# Patient Record
Sex: FEMALE | Race: WHITE | NOT HISPANIC OR LATINO | Employment: OTHER | URBAN - METROPOLITAN AREA
[De-identification: names, ages, dates, MRNs, and addresses within clinical notes are randomized per-mention and may not be internally consistent; named-entity substitution may affect disease eponyms.]

---

## 2017-10-13 ENCOUNTER — ALLSCRIPTS OFFICE VISIT (OUTPATIENT)
Dept: OTHER | Facility: OTHER | Age: 82
End: 2017-10-13

## 2017-10-13 ENCOUNTER — TRANSCRIBE ORDERS (OUTPATIENT)
Dept: ADMINISTRATIVE | Facility: HOSPITAL | Age: 82
End: 2017-10-13

## 2017-10-13 DIAGNOSIS — N83.209 CYST OF OVARY, UNSPECIFIED LATERALITY: ICD-10-CM

## 2017-10-13 DIAGNOSIS — C64.2 MALIGNANT NEOPLASM OF LEFT KIDNEY, EXCEPT RENAL PELVIS (HCC): Primary | ICD-10-CM

## 2017-10-13 LAB
BILIRUB UR QL STRIP: NORMAL
CLARITY UR: NORMAL
COLOR UR: YELLOW
GLUCOSE (HISTORICAL): NORMAL
HGB UR QL STRIP.AUTO: NORMAL
KETONES UR STRIP-MCNC: NORMAL MG/DL
LEUKOCYTE ESTERASE UR QL STRIP: NORMAL
NITRITE UR QL STRIP: NORMAL
PH UR STRIP.AUTO: 7.5 [PH]
PROT UR STRIP-MCNC: NORMAL MG/DL
SP GR UR STRIP.AUTO: 1.01
UROBILINOGEN UR QL STRIP.AUTO: 0.2

## 2018-01-14 VITALS
WEIGHT: 184 LBS | SYSTOLIC BLOOD PRESSURE: 154 MMHG | BODY MASS INDEX: 29.57 KG/M2 | HEIGHT: 66 IN | DIASTOLIC BLOOD PRESSURE: 82 MMHG

## 2018-09-28 ENCOUNTER — TELEPHONE (OUTPATIENT)
Dept: UROLOGY | Facility: AMBULATORY SURGERY CENTER | Age: 83
End: 2018-09-28

## 2018-10-02 ENCOUNTER — HOSPITAL ENCOUNTER (OUTPATIENT)
Dept: ULTRASOUND IMAGING | Facility: HOSPITAL | Age: 83
Discharge: HOME | End: 2018-10-02
Attending: UROLOGY
Payer: MEDICARE

## 2018-10-02 DIAGNOSIS — C64.2 MALIGNANT NEOPLASM OF LEFT KIDNEY, EXCEPT RENAL PELVIS (HCC): ICD-10-CM

## 2018-10-02 DIAGNOSIS — N83.209 CYST OF OVARY, UNSPECIFIED LATERALITY: ICD-10-CM

## 2018-10-02 DIAGNOSIS — C64.2 MALIGNANT NEOPLASM OF LEFT KIDNEY, EXCEPT RENAL PELVIS (HCC): Primary | ICD-10-CM

## 2018-10-02 PROCEDURE — 76770 US EXAM ABDO BACK WALL COMP: CPT

## 2018-10-03 ENCOUNTER — TELEPHONE (OUTPATIENT)
Dept: UROLOGY | Facility: MEDICAL CENTER | Age: 83
End: 2018-10-03

## 2018-10-12 RX ORDER — AMLODIPINE BESYLATE 2.5 MG/1
TABLET ORAL
Refills: 5 | COMMUNITY
Start: 2018-10-04

## 2018-10-12 RX ORDER — LANOLIN ALCOHOL/MO/W.PET/CERES
1 CREAM (GRAM) TOPICAL 3 TIMES DAILY
COMMUNITY

## 2018-10-12 RX ORDER — PYRIDOXINE HCL (VITAMIN B6) 100 MG
2 TABLET ORAL 2 TIMES DAILY
COMMUNITY

## 2018-10-12 RX ORDER — ZOLEDRONIC ACID 5 MG/100ML
5 INJECTION, SOLUTION INTRAVENOUS
COMMUNITY

## 2018-10-12 RX ORDER — CALCIUM CARBONATE/VITAMIN D3 500-10/5ML
1 LIQUID (ML) ORAL DAILY
COMMUNITY

## 2018-10-12 RX ORDER — FLUTICASONE PROPIONATE 220 UG/1
AEROSOL, METERED RESPIRATORY (INHALATION)
Refills: 11 | COMMUNITY
Start: 2018-09-17

## 2018-10-12 RX ORDER — AMOXICILLIN 250 MG
1 CAPSULE ORAL DAILY PRN
COMMUNITY

## 2018-10-12 RX ORDER — WARFARIN SODIUM 5 MG/1
TABLET ORAL
Refills: 4 | COMMUNITY
Start: 2018-08-14 | End: 2018-10-15 | Stop reason: ALTCHOICE

## 2018-10-12 RX ORDER — CETIRIZINE HYDROCHLORIDE 10 MG/1
10 TABLET ORAL
COMMUNITY
Start: 2010-12-11

## 2018-10-12 RX ORDER — ALPRAZOLAM 1 MG/1
TABLET ORAL
Refills: 0 | COMMUNITY
Start: 2018-09-11

## 2018-10-12 RX ORDER — LOSARTAN POTASSIUM 100 MG/1
TABLET ORAL
Refills: 1 | COMMUNITY
Start: 2018-08-20

## 2018-10-12 RX ORDER — CHLORAL HYDRATE 500 MG
1 CAPSULE ORAL 2 TIMES DAILY
COMMUNITY

## 2018-10-12 RX ORDER — RIVAROXABAN 20 MG/1
TABLET, FILM COATED ORAL
Refills: 5 | COMMUNITY
Start: 2018-09-28

## 2018-10-12 RX ORDER — DOCUSATE SODIUM 100 MG/1
1 CAPSULE, LIQUID FILLED ORAL DAILY
COMMUNITY

## 2018-10-12 RX ORDER — CHOLECALCIFEROL (VITAMIN D3) 125 MCG
1 CAPSULE ORAL
COMMUNITY

## 2018-10-12 RX ORDER — OMEPRAZOLE 40 MG/1
CAPSULE, DELAYED RELEASE ORAL
Refills: 3 | COMMUNITY
Start: 2018-08-18

## 2018-10-12 RX ORDER — ERGOCALCIFEROL (VITAMIN D2) 10 MCG
TABLET ORAL
COMMUNITY

## 2018-10-13 DIAGNOSIS — N83.209 CYST OF OVARY: ICD-10-CM

## 2018-10-13 DIAGNOSIS — C64.2 MALIGNANT NEOPLASM OF LEFT KIDNEY, EXCEPT RENAL PELVIS (HCC): ICD-10-CM

## 2018-10-15 ENCOUNTER — OFFICE VISIT (OUTPATIENT)
Dept: UROLOGY | Facility: MEDICAL CENTER | Age: 83
End: 2018-10-15
Payer: MEDICARE

## 2018-10-15 VITALS
DIASTOLIC BLOOD PRESSURE: 80 MMHG | SYSTOLIC BLOOD PRESSURE: 150 MMHG | BODY MASS INDEX: 29.66 KG/M2 | WEIGHT: 178 LBS | HEIGHT: 65 IN

## 2018-10-15 DIAGNOSIS — C64.9 MALIGNANT NEOPLASM OF KIDNEY, UNSPECIFIED LATERALITY (HCC): Primary | ICD-10-CM

## 2018-10-15 LAB
SL AMB  POCT GLUCOSE, UA: NORMAL
SL AMB LEUKOCYTE ESTERASE,UA: NORMAL
SL AMB POCT BILIRUBIN,UA: NORMAL
SL AMB POCT BLOOD,UA: NORMAL
SL AMB POCT CLARITY,UA: CLEAR
SL AMB POCT COLOR,UA: YELLOW
SL AMB POCT KETONES,UA: NORMAL
SL AMB POCT NITRITE,UA: NORMAL
SL AMB POCT PH,UA: 7.5
SL AMB POCT SPECIFIC GRAVITY,UA: 1.01
SL AMB POCT URINE PROTEIN: NORMAL
SL AMB POCT UROBILINOGEN: 0.2

## 2018-10-15 PROCEDURE — 81003 URINALYSIS AUTO W/O SCOPE: CPT | Performed by: UROLOGY

## 2018-10-15 PROCEDURE — 99213 OFFICE O/P EST LOW 20 MIN: CPT | Performed by: UROLOGY

## 2018-10-15 RX ORDER — ALBUTEROL SULFATE 90 UG/1
90 AEROSOL, METERED RESPIRATORY (INHALATION) DAILY
COMMUNITY
Start: 2016-06-28

## 2018-10-15 NOTE — LETTER
October 15, 2018     [facility] [phone]    Patient: Nadya Brown   YOB: 1928   Date of Visit: 10/15/2018       Dear Dr Andrzej Rabago: Thank you for referring Christi Byers to me for evaluation  Below are my notes for this consultation  If you have questions, please do not hesitate to call me  I look forward to following your patient along with you  Sincerely,        Lin Escalera MD        CC: No Recipients  Lin Escalera MD  10/15/2018 10:21 AM  Sign at close encounter  [address] [facility]  [address], [address] [place] [address]  [phone]  www  [facility]  org      NAME: Nadya Brown  AGE: 80 y o  SEX: female  : 1928   MRN: [id number]    DATE: 10/15/2018  TIME: 9:08 AM    Assessment and Plan: Left RCC, 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 in IR  Given her age and the stability, we can see her back p r n   No need for further imaging  Chief Complaint   No chief complaint on file  History of Present Illness      I have kidney cancer  HPI: Christi Byers is a 80year-old female established patient who is here for kidney cancer  The problem is on the left side  She kidney cancer was diagnosed 8 years ago  9 yrs s/p cryoablation of left renal mass  Most recent XIOMARA 10/03/2018 with no evidence of recurrence, no solid mass seen, just simple cysts    Pt doing well with no complaints  Denies flank pain, unwanted weight loss, appetite changes  Has stable right ovarian cyst, 5cm seen last year             ALLERGIES: Ceclor  Ibuprofen  NSAIDS (Non-Steroidal Anti-Inflammatory Drug)       MEDICATIONS: Daily Multivitamin 1 tablet PO Daily   Zyrtec 10 mg capsule 1 capsule PO Daily   Alprazolam 0 5 mg tablet 1 tablet PO QID   Amlodipine Besylate 2 5 mg tablet 1 tablet PO Daily   Aspirin 81 mg tablet, chewable 1 tablet PO Daily   Calcium + D 500 mg calcium-1,000 unit-40 mcg tablet, chewable 1 capsule PO TID   Colace 100 mg capsule 1 capsule PO Daily   Cranberry 500 mg capsule 2 capsule PO BID   Fish Oil 1,000 mg (120 mg-180 mg) capsule 1 capsule PO BID   Flovent Hfa 220 mcg aerosol with adapter 2 Puff BID   Garlic 2 tablet PO Daily   Losartan Potassium 100 mg tablet 1 tablet PO Daily   Magnesium Oxide 400 mg tablet 1 tablet PO Daily   Melatonin 5 mg tablet 1 tablet PO Q HS   Miralax 17 gram powder in packet 1 PO Daily   Omeprazole 40 mg capsule,delayed release 1 capsule PO Daily   Proair Hfa 90 mcg hfa aerosol with adapter 2 Puff BID   Proventil Hfa 90 mcg hfa aerosol with adapter 2 Puff BID   Reclast 5 mg/100 ml iv solution, piggyback, bottle 1 infusion IV Q1Y   Senokot-S 8 6 mg-50 mg tablet 1 tablet PO Daily         PSH: Hysterectomy - 1966   Cryo Ablate Renal Mass, percutaneous Left - 2009       NON- PSH: Appendectomy - 1944  Back Surgery (Unspecified) - 1968  Skin Graft, Left, ankle - 2016  Total Knee Replacement, Left - 2009, Right - 2004          PMH: Malignant neoplasm of left kidney, except renal pelvis (Malignant neoplasm of left kidney, except renal pelvis) (Stable), Condition stable  No evidence of recurrence  - 10/13/2016  Kidney Cancer - 2015, - 2014, - 2013, - 2012  Back Pain       NON- PMH: Allergies, Seasonal  Arthritis  Asthma  Asymptomatic Varicose Veins  Cervicalgia  Chronic Sinusitis Unspec  Cough  Heartburn  Hx Oth Drug Allergy  Hypertension  Joint Pain Unspec  Personal history of methicillin resis staph infection (Personal history of methicillin resis staph infection)        Immunizations: None     FAMILY HISTORY: Heart Condition - Mother, Runs in Family     SOCIAL HISTORY: Marital Status:   Current Smoking Status: Patient has never smoked  Has never drank  Drinks 1 caffeinated drink per day  Has had a blood transfusion         The following portions of the patient's history were reviewed and updated as appropriate: allergies, current medications, past family history, past medical history, past social history, past surgical history and problem list     Review of Systems   Review of Systems   Genitourinary: Negative  Active Problem List   There is no problem list on file for this patient  Objective   There were no vitals taken for this visit  Physical Exam   Constitutional: She is oriented to person, place, and time  She appears well-developed and well-nourished  HENT:   Head: Normocephalic and atraumatic  Eyes: EOM are normal    Neck: Normal range of motion  Pulmonary/Chest: Effort normal    Musculoskeletal: Normal range of motion  Uses walker   Neurological: She is alert and oriented to person, place, and time  Skin: Skin is warm and dry  Psychiatric: She has a normal mood and affect   Her behavior is normal  Judgment and thought content normal            Current Medications     Current Outpatient Prescriptions:     cetirizine (ZyrTEC) 10 mg tablet, Take 10 mg by mouth, Disp: , Rfl:     ALPRAZolam (XANAX) 1 mg tablet, TK 1/2 T PO BID AND 1 T HS, Disp: , Rfl: 0    amLODIPine (NORVASC) 2 5 mg tablet, TK 2 TS PO QD, Disp: , Rfl: 5    aspirin 81 MG tablet, Take 1 tablet by mouth daily, Disp: , Rfl:     calcium citrate-vitamin D (CITRACAL+D) 315-200 MG-UNIT per tablet, Take 1 tablet by mouth 3 (three) times a day, Disp: , Rfl:     Cranberry 500 MG CAPS, Take 2 capsules by mouth 2 (two) times a day, Disp: , Rfl:     docusate sodium (COLACE) 100 mg capsule, Take 1 capsule by mouth daily, Disp: , Rfl:     FLOVENT  MCG/ACT inhaler, , Disp: , Rfl: 11    Garlic 10 MG CAPS, Take 2 capsules by mouth daily, Disp: , Rfl:     losartan (COZAAR) 100 MG tablet, TK 1 T PO D, Disp: , Rfl: 1    Magnesium Oxide 400 MG CAPS, Take 1 capsule by mouth daily, Disp: , Rfl:     Melatonin 5 MG TABS, Take 1 tablet by mouth, Disp: , Rfl:     Multiple Vitamin (DAILY VALUE MULTIVITAMIN) TABS, Take by mouth, Disp: , Rfl:     Omega-3 Fatty Acids (FISH OIL) 1,000 mg, Take 1 capsule by mouth 2 (two) times a day, Disp: , Rfl:     omeprazole (PriLOSEC) 40 MG capsule, TK 1 C PO QD AC, Disp: , Rfl: 3    Polyethylene Glycol 3350 (MIRALAX PO), Take by mouth, Disp: , Rfl:     PROAIR  (90 Base) MCG/ACT inhaler, INHALE 2 PUFFS PO TID, Disp: , Rfl: 1    senna-docusate sodium (SENOKOT S) 8 6-50 mg per tablet, Take 1 tablet by mouth daily as needed, Disp: , Rfl:     warfarin (COUMADIN) 5 mg tablet, TK 1 AND 1/2 TS PO QD OR UTD, Disp: , Rfl: 4    XARELTO 20 MG tablet, TK 1 T PO QD WITH THE OLVIN MEAL, Disp: , Rfl: 5    zoledronic acid (RECLAST) 5 mg/100 mL IV infusion (premix), Infuse 5 mg into a venous catheter, Disp: , Rfl:         Leslee Cho MD

## 2018-10-15 NOTE — PROGRESS NOTES
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 in IR  Given her age and the stability, we can see her back p r n   No need for further imaging  Chief Complaint   No chief complaint on file  History of Present Illness      I have kidney cancer  HPI: Sorin Christianson is a 80year-old female established patient who is here for kidney cancer  The problem is on the left side  She kidney cancer was diagnosed 8 years ago  9 yrs s/p cryoablation of left renal mass  Most recent XIOMARA 10/03/2018 with no evidence of recurrence, no solid mass seen, just simple cysts    Pt doing well with no complaints  Denies flank pain, unwanted weight loss, appetite changes  Has stable right ovarian cyst, 5cm seen last year             ALLERGIES: Ceclor  Ibuprofen  NSAIDS (Non-Steroidal Anti-Inflammatory Drug)       MEDICATIONS: Daily Multivitamin 1 tablet PO Daily   Zyrtec 10 mg capsule 1 capsule PO Daily   Alprazolam 0 5 mg tablet 1 tablet PO QID   Amlodipine Besylate 2 5 mg tablet 1 tablet PO Daily   Aspirin 81 mg tablet, chewable 1 tablet PO Daily   Calcium + D 500 mg calcium-1,000 unit-40 mcg tablet, chewable 1 capsule PO TID   Colace 100 mg capsule 1 capsule PO Daily   Cranberry 500 mg capsule 2 capsule PO BID   Fish Oil 1,000 mg (120 mg-180 mg) capsule 1 capsule PO BID   Flovent Hfa 220 mcg aerosol with adapter 2 Puff BID   Garlic 2 tablet PO Daily   Losartan Potassium 100 mg tablet 1 tablet PO Daily   Magnesium Oxide 400 mg tablet 1 tablet PO Daily   Melatonin 5 mg tablet 1 tablet PO Q HS   Miralax 17 gram powder in packet 1 PO Daily   Omeprazole 40 mg capsule,delayed release 1 capsule PO Daily   Proair Hfa 90 mcg hfa aerosol with adapter 2 Puff BID   Proventil Hfa 90 mcg hfa aerosol with adapter 2 Puff BID   Reclast 5 mg/100 ml iv solution, piggyback, bottle 1 infusion IV Q1Y   Senokot-S 8 6 mg-50 mg tablet 1 tablet PO Daily         PSH: Hysterectomy - 1966   Cryo Ablate Renal Mass, percutaneous Left - 2009       NON- PSH: Appendectomy - 1944  Back Surgery (Unspecified) - 1968  Skin Graft, Left, ankle - 2016  Total Knee Replacement, Left - 2009, Right - 2004          PMH: Malignant neoplasm of left kidney, except renal pelvis (Malignant neoplasm of left kidney, except renal pelvis) (Stable), Condition stable  No evidence of recurrence  - 10/13/2016  Kidney Cancer - 2015, - 2014, - 2013, - 2012  Back Pain       NON- PMH: Allergies, Seasonal  Arthritis  Asthma  Asymptomatic Varicose Veins  Cervicalgia  Chronic Sinusitis Unspec  Cough  Heartburn  Hx Oth Drug Allergy  Hypertension  Joint Pain Unspec  Personal history of methicillin resis staph infection (Personal history of methicillin resis staph infection)        Immunizations: None     FAMILY HISTORY: Heart Condition - Mother, Runs in Family     SOCIAL HISTORY: Marital Status:   Current Smoking Status: Patient has never smoked  Has never drank  Drinks 1 caffeinated drink per day  Has had a blood transfusion  The following portions of the patient's history were reviewed and updated as appropriate: allergies, current medications, past family history, past medical history, past social history, past surgical history and problem list     Review of Systems   Review of Systems   Genitourinary: Negative  Active Problem List   There is no problem list on file for this patient  Objective   There were no vitals taken for this visit  Physical Exam   Constitutional: She is oriented to person, place, and time  She appears well-developed and well-nourished  HENT:   Head: Normocephalic and atraumatic     Eyes: EOM are normal    Neck: Normal range of motion  Pulmonary/Chest: Effort normal    Musculoskeletal: Normal range of motion  Uses walker   Neurological: She is alert and oriented to person, place, and time  Skin: Skin is warm and dry  Psychiatric: She has a normal mood and affect   Her behavior is normal  Judgment and thought content normal            Current Medications     Current Outpatient Prescriptions:     cetirizine (ZyrTEC) 10 mg tablet, Take 10 mg by mouth, Disp: , Rfl:     ALPRAZolam (XANAX) 1 mg tablet, TK 1/2 T PO BID AND 1 T HS, Disp: , Rfl: 0    amLODIPine (NORVASC) 2 5 mg tablet, TK 2 TS PO QD, Disp: , Rfl: 5    aspirin 81 MG tablet, Take 1 tablet by mouth daily, Disp: , Rfl:     calcium citrate-vitamin D (CITRACAL+D) 315-200 MG-UNIT per tablet, Take 1 tablet by mouth 3 (three) times a day, Disp: , Rfl:     Cranberry 500 MG CAPS, Take 2 capsules by mouth 2 (two) times a day, Disp: , Rfl:     docusate sodium (COLACE) 100 mg capsule, Take 1 capsule by mouth daily, Disp: , Rfl:     FLOVENT  MCG/ACT inhaler, , Disp: , Rfl: 11    Garlic 10 MG CAPS, Take 2 capsules by mouth daily, Disp: , Rfl:     losartan (COZAAR) 100 MG tablet, TK 1 T PO D, Disp: , Rfl: 1    Magnesium Oxide 400 MG CAPS, Take 1 capsule by mouth daily, Disp: , Rfl:     Melatonin 5 MG TABS, Take 1 tablet by mouth, Disp: , Rfl:     Multiple Vitamin (DAILY VALUE MULTIVITAMIN) TABS, Take by mouth, Disp: , Rfl:     Omega-3 Fatty Acids (FISH OIL) 1,000 mg, Take 1 capsule by mouth 2 (two) times a day, Disp: , Rfl:     omeprazole (PriLOSEC) 40 MG capsule, TK 1 C PO QD AC, Disp: , Rfl: 3    Polyethylene Glycol 3350 (MIRALAX PO), Take by mouth, Disp: , Rfl:     PROAIR  (90 Base) MCG/ACT inhaler, INHALE 2 PUFFS PO TID, Disp: , Rfl: 1    senna-docusate sodium (SENOKOT S) 8 6-50 mg per tablet, Take 1 tablet by mouth daily as needed, Disp: , Rfl:     warfarin (COUMADIN) 5 mg tablet, TK 1 AND 1/2 TS PO QD OR UTD, Disp: , Rfl: 4    XARELTO 20 MG tablet, TK 1 T PO QD WITH THE OLVIN MEAL, Disp: , Rfl: 5    zoledronic acid (RECLAST) 5 mg/100 mL IV infusion (premix), Infuse 5 mg into a venous catheter, Disp: , Rfl:         Viona Peabody, MD

## 2021-02-01 DIAGNOSIS — Z23 ENCOUNTER FOR IMMUNIZATION: ICD-10-CM
